# Patient Record
Sex: FEMALE | NOT HISPANIC OR LATINO | URBAN - METROPOLITAN AREA
[De-identification: names, ages, dates, MRNs, and addresses within clinical notes are randomized per-mention and may not be internally consistent; named-entity substitution may affect disease eponyms.]

---

## 2017-03-13 ENCOUNTER — IMPORTED ENCOUNTER (OUTPATIENT)
Dept: URBAN - METROPOLITAN AREA CLINIC 1 | Facility: CLINIC | Age: 37
End: 2017-03-13

## 2017-03-13 PROCEDURE — 92015 DETERMINE REFRACTIVE STATE: CPT

## 2018-01-12 ENCOUNTER — IMPORTED ENCOUNTER (OUTPATIENT)
Dept: URBAN - METROPOLITAN AREA CLINIC 1 | Facility: CLINIC | Age: 38
End: 2018-01-12

## 2018-01-12 PROBLEM — H52.03: Noted: 2018-01-12

## 2018-01-12 PROCEDURE — S0621 ROUTINE OPHTHALMOLOGICAL EXA: HCPCS

## 2018-01-12 NOTE — PATIENT DISCUSSION
1.  Hyperopia: Rx was given for corrective spectacles if indicated. 2.  Dry Eyes OU CTL trials givenReturn for an appointment in 1 week cc with Dr. Wing Gonzalez.

## 2018-10-18 ENCOUNTER — IMPORTED ENCOUNTER (OUTPATIENT)
Dept: URBAN - METROPOLITAN AREA CLINIC 1 | Facility: CLINIC | Age: 38
End: 2018-10-18

## 2018-10-18 PROBLEM — H04.123: Noted: 2018-10-18

## 2018-10-18 PROBLEM — H01.002: Noted: 2018-10-18

## 2018-10-18 PROBLEM — H15.101: Noted: 2018-10-18

## 2018-10-18 PROBLEM — H01.004: Noted: 2018-10-18

## 2018-10-18 PROBLEM — H01.001: Noted: 2018-10-18

## 2018-10-18 PROBLEM — H01.005: Noted: 2018-10-18

## 2018-10-18 PROCEDURE — 92012 INTRM OPH EXAM EST PATIENT: CPT

## 2018-10-18 NOTE — PATIENT DISCUSSION
1.  Episcleritis OD - The patient presented with pain and redness of his/her right eye. The inflammation is limited to the superficial episcleral tissue. Treatment with topical steroid drops and systemic NSAIDs was recommended. Pred Forte TID x 1 week then BID x 1 week then Qday x 1 week **Shake Well** (ERx'd)2. Anterior Blepharitis OU - Daily Hot compresses and lid scrubs were recommended. 3. Dry Eyes OU -- Recommend the frequent use of OTC AT's BID-QID OU. Return for an appointment in 3 WKS for a 10 (Episcleritis Recheck OD) with Dr. Rocco Landa.

## 2019-11-27 ENCOUNTER — IMPORTED ENCOUNTER (OUTPATIENT)
Dept: URBAN - METROPOLITAN AREA CLINIC 1 | Facility: CLINIC | Age: 39
End: 2019-11-27

## 2019-11-27 PROBLEM — H52.03: Noted: 2019-11-27

## 2019-11-27 PROBLEM — Z01.00: Noted: 2019-11-27

## 2019-11-27 PROCEDURE — S0621 ROUTINE OPHTHALMOLOGICAL EXA: HCPCS

## 2019-11-27 NOTE — PATIENT DISCUSSION
Hyperopia: Rx was given for corrective spectacles if indicated. Dry Eye OU -- Recommend the use of ATs BID OU routinely.

## 2019-11-27 NOTE — PATIENT DISCUSSION
1. Routine Exam -- Patient has minimal refractive error. All conditions discussed with patient today. 2. Dry Eye OU -- Recommend the use of ATs BID OU. Return for an appointment in 1 year for a 36 with Dr. Joni Schaumann.

## 2021-10-08 ENCOUNTER — IMPORTED ENCOUNTER (OUTPATIENT)
Dept: URBAN - METROPOLITAN AREA CLINIC 1 | Facility: CLINIC | Age: 41
End: 2021-10-08

## 2021-10-08 PROBLEM — H52.4: Noted: 2021-10-08

## 2021-10-08 PROBLEM — H52.01: Noted: 2021-10-08

## 2021-10-08 PROCEDURE — S0621 ROUTINE OPHTHALMOLOGICAL EXA: HCPCS

## 2021-10-08 NOTE — PATIENT DISCUSSION
1.  Hyperopia -- Rx was given for corrective spectacles if indicated. 2.  Presbyopia 3. Dry Eyes OU -- Cont ATs BID OU routinely. 4.  Anterior Blepharitis OU -- Cont daily hot compresses lid scrubs and washing lids with baby shampoo QHS. 5. H/o Episcleritis OD 6. H/o CTL wear Return for an appointment in 1 year 36 with Dr. Katharine Gimenez.

## 2022-04-03 ASSESSMENT — TONOMETRY
OS_IOP_MMHG: 11
OD_IOP_MMHG: 12
OS_IOP_MMHG: 12
OD_IOP_MMHG: 11
OS_IOP_MMHG: 12
OS_IOP_MMHG: 13
OD_IOP_MMHG: 13
OD_IOP_MMHG: 11

## 2022-04-03 ASSESSMENT — KERATOMETRY
OD_K2POWER_DIOPTERS: 41.75
OD_AXISANGLE2_DEGREES: 052
OS_AXISANGLE2_DEGREES: 107
OS_K1POWER_DIOPTERS: 41.25
OD_K1POWER_DIOPTERS: 41.75
OS_K2POWER_DIOPTERS: 41.75

## 2022-04-03 ASSESSMENT — VISUAL ACUITY
OD_SC: 20/20
OS_SC: 20/20-1
OD_SC: J1+
OS_CC: 20/20
OD_CC: J1+
OS_SC: J1
OS_CC: 20/20
OD_SC: J1
OD_CC: 20/20
OS_SC: J1+
OD_CC: 20/20
OD_CC: 20/20
OD_SC: J1+
OD_SC: 20/20-1
OS_CC: J1+
OS_CC: 20/20
OS_SC: 20/20
OS_SC: J1+

## 2024-04-17 ENCOUNTER — COMPREHENSIVE EXAM (OUTPATIENT)
Dept: URBAN - METROPOLITAN AREA CLINIC 1 | Facility: CLINIC | Age: 44
End: 2024-04-17

## 2024-04-17 DIAGNOSIS — H52.4: ICD-10-CM

## 2024-04-17 DIAGNOSIS — Z01.00: ICD-10-CM

## 2024-04-17 DIAGNOSIS — H52.221: ICD-10-CM

## 2024-04-17 PROCEDURE — 92014 COMPRE OPH EXAM EST PT 1/>: CPT

## 2024-04-17 PROCEDURE — 92015 DETERMINE REFRACTIVE STATE: CPT

## 2024-04-17 ASSESSMENT — VISUAL ACUITY
OD_SC: J1
OD_SC: 20/20
OS_SC: 20/20
OS_SC: J1

## 2025-07-02 ENCOUNTER — COMPREHENSIVE EXAM (OUTPATIENT)
Age: 45
End: 2025-07-02

## 2025-07-02 DIAGNOSIS — H52.221: ICD-10-CM

## 2025-07-02 DIAGNOSIS — Z01.00: ICD-10-CM

## 2025-07-02 DIAGNOSIS — H52.4: ICD-10-CM

## 2025-07-02 PROCEDURE — 92014 COMPRE OPH EXAM EST PT 1/>: CPT

## 2025-07-02 PROCEDURE — 92015 DETERMINE REFRACTIVE STATE: CPT
